# Patient Record
Sex: MALE | Race: OTHER | HISPANIC OR LATINO | Employment: OTHER | URBAN - METROPOLITAN AREA
[De-identification: names, ages, dates, MRNs, and addresses within clinical notes are randomized per-mention and may not be internally consistent; named-entity substitution may affect disease eponyms.]

---

## 2021-08-19 PROBLEM — M23.41 LOOSE BODY IN KNEE, RIGHT KNEE: Status: ACTIVE | Noted: 2021-08-19

## 2021-09-21 ENCOUNTER — HOSPITAL ENCOUNTER (OUTPATIENT)
Facility: MEDICAL CENTER | Age: 41
End: 2021-09-21
Attending: ANESTHESIOLOGY
Payer: COMMERCIAL

## 2021-09-21 LAB — COVID ORDER STATUS COVID19: NORMAL

## 2021-09-21 PROCEDURE — U0003 INFECTIOUS AGENT DETECTION BY NUCLEIC ACID (DNA OR RNA); SEVERE ACUTE RESPIRATORY SYNDROME CORONAVIRUS 2 (SARS-COV-2) (CORONAVIRUS DISEASE [COVID-19]), AMPLIFIED PROBE TECHNIQUE, MAKING USE OF HIGH THROUGHPUT TECHNOLOGIES AS DESCRIBED BY CMS-2020-01-R: HCPCS

## 2021-09-21 PROCEDURE — U0005 INFEC AGEN DETEC AMPLI PROBE: HCPCS

## 2021-09-22 LAB
SARS-COV-2 RNA RESP QL NAA+PROBE: NOTDETECTED
SPECIMEN SOURCE: NORMAL

## 2021-09-27 PROBLEM — G89.18 POSTOPERATIVE PAIN: Status: ACTIVE | Noted: 2021-09-27

## 2024-02-20 ENCOUNTER — HOSPITAL ENCOUNTER (EMERGENCY)
Facility: MEDICAL CENTER | Age: 44
End: 2024-02-21
Attending: EMERGENCY MEDICINE

## 2024-02-20 VITALS
HEART RATE: 62 BPM | DIASTOLIC BLOOD PRESSURE: 65 MMHG | WEIGHT: 204.81 LBS | OXYGEN SATURATION: 95 % | HEIGHT: 66 IN | RESPIRATION RATE: 18 BRPM | BODY MASS INDEX: 32.92 KG/M2 | SYSTOLIC BLOOD PRESSURE: 108 MMHG | TEMPERATURE: 97.8 F

## 2024-02-20 DIAGNOSIS — H11.89 CONJUNCTIVAL IRRITATION: ICD-10-CM

## 2024-02-20 PROCEDURE — 700101 HCHG RX REV CODE 250: Performed by: EMERGENCY MEDICINE

## 2024-02-20 PROCEDURE — 99283 EMERGENCY DEPT VISIT LOW MDM: CPT

## 2024-02-20 RX ORDER — PREDNISOLONE ACETATE 10 MG/ML
1 SUSPENSION/ DROPS OPHTHALMIC 4 TIMES DAILY
Qty: 5 ML | Refills: 0 | Status: SHIPPED | OUTPATIENT
Start: 2024-02-20 | End: 2024-02-24

## 2024-02-20 RX ORDER — PROPARACAINE HYDROCHLORIDE 5 MG/ML
1 SOLUTION/ DROPS OPHTHALMIC ONCE
Status: COMPLETED | OUTPATIENT
Start: 2024-02-20 | End: 2024-02-20

## 2024-02-20 RX ADMIN — FLUORESCEIN SODIUM 1 MG: 1 STRIP OPHTHALMIC at 22:30

## 2024-02-20 RX ADMIN — PROPARACAINE HYDROCHLORIDE 1 DROP: 5 SOLUTION/ DROPS OPHTHALMIC at 22:17

## 2024-02-20 ASSESSMENT — PAIN DESCRIPTION - PAIN TYPE: TYPE: ACUTE PAIN

## 2024-02-21 NOTE — ED TRIAGE NOTES
"Chief Complaint   Patient presents with    Eye Pain     Pt was taking sheet rock out of his truck and got some of the material in his eye. Pt states he is having difficulty seeing out of his R eye. Pt has redness in bilateral eyes.     Pt ambulatory to triage with above complaint. Daughter used for translation.    Placed back in triage and educated on triage process. Encouraged to alert staff to any changes in vision.     A+Ox4, GCS 15, NAD.    /74   Pulse 72   Temp 36.4 °C (97.5 °F) (Temporal)   Resp 18   Ht 1.676 m (5' 6\")   Wt 92.9 kg (204 lb 12.9 oz)   SpO2 98%   BMI 33.06 kg/m²        "

## 2024-02-21 NOTE — ED PROVIDER NOTES
"ED Provider Note    CHIEF COMPLAINT  Chief Complaint   Patient presents with    Eye Pain     Pt was taking sheet rock out of his truck and got some of the material in his eye. Pt states he is having difficulty seeing out of his R eye. Pt has redness in bilateral eyes.       EXTERNAL RECORDS REVIEWED  Other no documentation in EMR related to eyes    HPI/ROS  LIMITATION TO HISTORY   Select: Language Danish,  Used   OUTSIDE HISTORIAN(S):  Family at bedside providing history with additional help from     Jeremy Sherman is a 43 y.o. male who presents to the ER with primary complaint of bilateral eye pain.  States he was working with some sheet rock completed some of the material may have gotten his eyes.  He did irrigate his eyes but continues to have some redness and discomfort.  No prior history of the same.  Does not wear glasses or contacts.  Denies any other injury or complaint currently.    PAST MEDICAL HISTORY       SURGICAL HISTORY   has a past surgical history that includes knee scope,remv loose body (Right, 9/27/2021).    FAMILY HISTORY  History reviewed. No pertinent family history.    SOCIAL HISTORY  Social History     Tobacco Use    Smoking status: Never    Smokeless tobacco: Never   Substance and Sexual Activity    Alcohol use: Not on file    Drug use: Not on file    Sexual activity: Not on file       CURRENT MEDICATIONS  Home Medications       Reviewed by Isaac Brown R.N. (Registered Nurse) on 02/20/24 at 2012  Med List Status: Partial     Medication Last Dose Status   ibuprofen (MOTRIN) 800 MG Tab  Active   ondansetron (ZOFRAN) 4 MG Tab tablet  Active                    ALLERGIES  No Known Allergies    PHYSICAL EXAM  VITAL SIGNS: /65   Pulse 62   Temp 36.6 °C (97.8 °F) (Temporal)   Resp 18   Ht 1.676 m (5' 6\")   Wt 92.9 kg (204 lb 12.9 oz)   SpO2 95%   BMI 33.06 kg/m²      Pulse ox interpretation: I interpret this pulse ox as normal.  Constitutional: Alert in no " apparent distress.  HENT: No signs of trauma, Bilateral external ears normal, Nose normal.     Eyes: Pupils are equal and reactive please see nursing notes for visual acuities..  Initial slit-lamp is benign.  Anterior clear.  No cell or flare or hyphema.  Fluorescein added after proparacaine and no dye uptake.  Negative Leo sign.    Thorax & Lungs: No respiratory distress    Skin: Warm, Dry, No erythema, No rash.     Musculoskeletal: Good range of motion in all major joints.   Neurologic: Alert , Normal motor function, Normal sensory function, No focal deficits noted.   Psychiatric: Affect normal, Judgment normal, Mood normal.         COURSE & MEDICAL DECISION MAKING    ED Observation Status? No; Patient does not meet criteria for ED Observation.     INITIAL ASSESSMENT, COURSE AND PLAN  Care Narrative: 43-year-old male presenting with eye discomfort after likely foreign body contamination after working with [employer].  Will complete eye workup    Please see ocular exam as above to include slit-lamp exam    DISPOSITION AND DISCUSSIONS   I have discussed management of the patient with the following physicians and JARVIS's: None    Discussion of management with other QHP or appropriate source(s): None     Escalation of care considered, and ultimately not performed:diagnostic imaging    Barriers to care at this time, including but not limited to: Patient does not have established PCP.     Decision tools and prescription drugs considered including, but not limited to: Antibiotics deferred .    33-year-old male presenting to the emerged part with the above presentation.  Exam is reassuring.  Patient will be provided with ocular eyedrops for comfort and inflammatory care.  Understanding of return precautions here to the ER if needed.    FINAL DIAGNOSIS  1. Conjunctival irritation           Electronically signed by: Mendez Collado M.D., 2/20/2024 10:09 PM

## 2024-02-21 NOTE — ED NOTES
Discharge education provided by ERP. Discharge paperwork reviewed with pt. Prescription to be picked up by pt. All questions answered. All belongings with pt. Pt ambulated to lobby unassisted with steady gait.

## 2024-02-21 NOTE — ED PROVIDER NOTES
"ED Provider Note    CHIEF COMPLAINT  Chief Complaint   Patient presents with   • Eye Pain     Pt was taking sheet rock out of his truck and got some of the material in his eye. Pt states he is having difficulty seeing out of his R eye. Pt has redness in bilateral eyes.       EXTERNAL RECORDS REVIEWED  Other no pertinent history on chart review    HPI/ROS  LIMITATION TO HISTORY   Select: : None  OUTSIDE HISTORIAN(S):  Family present    Jeremy Sherman is a 43 y.o. male who presents to the Emergency Department with bilateral eye pain right greater than left.  States that he was working with some sheet rock earlier today and feels that some of the particulate may have got in his eyes or cause him discomfort.  Has had itchy eyes.  Denies any chronic eye conditions.  No glasses or contacts.  Did irrigate his eyes after the initial exposure.    PAST MEDICAL HISTORY       SURGICAL HISTORY   has a past surgical history that includes knee scope,remv loose body (Right, 9/27/2021).    FAMILY HISTORY  History reviewed. No pertinent family history.    SOCIAL HISTORY  Social History     Tobacco Use   • Smoking status: Never   • Smokeless tobacco: Never   Substance and Sexual Activity   • Alcohol use: Not on file   • Drug use: Not on file   • Sexual activity: Not on file       CURRENT MEDICATIONS  Home Medications       Reviewed by Isaac Brown R.N. (Registered Nurse) on 02/20/24 at 2012  Med List Status: Partial     Medication Last Dose Status   ibuprofen (MOTRIN) 800 MG Tab  Active   ondansetron (ZOFRAN) 4 MG Tab tablet  Active                    ALLERGIES  No Known Allergies    PHYSICAL EXAM  VITAL SIGNS: /65   Pulse 62   Temp 36.6 °C (97.8 °F) (Temporal)   Resp 18   Ht 1.676 m (5' 6\")   Wt 92.9 kg (204 lb 12.9 oz)   SpO2 95%   BMI 33.06 kg/m²      Pulse ox interpretation: I interpret this pulse ox as normal.  Constitutional: Alert in no apparent distress.  HENT: No signs of trauma, Bilateral external ears " normal, Nose normal.   Eyes: Pupils are equal and reactive.   Neck: Normal range of motion, No tenderness, Supple  Cardiovascular: Regular rate and rhythm, no murmurs.   Thorax & Lungs: Normal breath sounds, No respiratory distress, No wheezing, No chest tenderness.   Skin: Warm, Dry, No erythema, No rash.   Musculoskeletal: Good range of motion in all major joints. No tenderness to palpation or major deformities noted.   Neurologic: Alert , Normal motor function, Normal sensory function, No focal deficits noted.   Psychiatric: Affect normal, Judgment normal, Mood normal.       COURSE & MEDICAL DECISION MAKING    ED Observation Status? No; Patient does not meet criteria for ED Observation.     INITIAL ASSESSMENT, COURSE AND PLAN  Care Narrative: ***  {CHECKLIST:742886}      ADDITIONAL PROBLEM LIST  ***  DISPOSITION AND DISCUSSIONS  I have discussed management of the patient with the following physicians and JARVIS's:  ***    Discussion of management with other QHP or appropriate source(s): {Select:67575}     Escalation of care considered, and ultimately not performed:{Select:09485}    Barriers to care at this time, including but not limited to: {Select:56532}.     Decision tools and prescription drugs considered including, but not limited to: {Select:43151}.    FINAL DIAGNOSIS  1. Conjunctival irritation           Electronically signed by: Mendez Collado M.D., 2/20/2024 10:09 PM